# Patient Record
Sex: MALE | Race: WHITE
[De-identification: names, ages, dates, MRNs, and addresses within clinical notes are randomized per-mention and may not be internally consistent; named-entity substitution may affect disease eponyms.]

---

## 2017-11-07 ENCOUNTER — HOSPITAL ENCOUNTER (OUTPATIENT)
Dept: HOSPITAL 18 - NAV RAD | Age: 57
Discharge: HOME | End: 2017-11-07
Payer: COMMERCIAL

## 2017-11-07 DIAGNOSIS — M25.571: ICD-10-CM

## 2017-11-07 DIAGNOSIS — M17.11: ICD-10-CM

## 2017-11-07 DIAGNOSIS — M85.861: ICD-10-CM

## 2017-11-07 DIAGNOSIS — M25.561: Primary | ICD-10-CM

## 2017-11-07 DIAGNOSIS — M19.071: ICD-10-CM

## 2017-11-07 DIAGNOSIS — Z96.641: ICD-10-CM

## 2017-11-07 NOTE — RAD
TWO VIEWS RIGHT SHOULDER:

 

History: Disability evaluation. 

 

Comparison: None. 

 

FINDINGS: 

No obvious dislocation. Limited evaluation of the glenohumeral joint space. No definite fractures. 

 

IMPRESSION: 

Limited but unremarkable examination two views right shoulder. 

 

POS: [facility]

## 2017-11-07 NOTE — RAD
TWO VIEWS RIGHT ANKLE:

 

Indication: Disability evaluation. 

 

Comparison: None. 

 

FINDINGS: 

There is healed fracture deformity involving the calcaneus. There is off set of the posterior facet o
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 No acute fracture is evide
nt. Enthesopathic changes see of posterior calcaneus. 

 

IMPRESSION: 

1. 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
acture. 

2. No acute osseous abnormality. 

 

POS: [facility]

## 2017-11-07 NOTE — RAD
TWO VIEWS RIGHT KNEE:

 

Indication: Right knee pain. Disability evaluation. 

 

Comparison: None. 

 

FINDINGS: 

There is mild joint capsular distention. There is mild osteoarthrosis of the right knee. There is dif
fuse osteopenia. 

 

IMPRESSION: 

1. Mild osteoarthrosis of the right knee. 

2. Mild joint capsular distention. 

3. Diffuse osteopenia.

 

POS: [facility]

## 2023-01-04 ENCOUNTER — HOSPITAL ENCOUNTER (OUTPATIENT)
Dept: HOSPITAL 92 - LABBT | Age: 63
Discharge: HOME | End: 2023-01-04
Attending: NEUROLOGICAL SURGERY
Payer: COMMERCIAL

## 2023-01-04 ENCOUNTER — HOSPITAL ENCOUNTER (INPATIENT)
Dept: HOSPITAL 92 - SURG A | Age: 63
LOS: 6 days | Discharge: HOME | DRG: 460 | End: 2023-01-10
Attending: NEUROLOGICAL SURGERY | Admitting: NEUROLOGICAL SURGERY
Payer: COMMERCIAL

## 2023-01-04 VITALS — BODY MASS INDEX: 28.3 KG/M2

## 2023-01-04 DIAGNOSIS — Z20.822: ICD-10-CM

## 2023-01-04 DIAGNOSIS — Z79.899: ICD-10-CM

## 2023-01-04 DIAGNOSIS — Z91.09: ICD-10-CM

## 2023-01-04 DIAGNOSIS — I10: ICD-10-CM

## 2023-01-04 DIAGNOSIS — Z01.818: Primary | ICD-10-CM

## 2023-01-04 DIAGNOSIS — M48.062: Primary | ICD-10-CM

## 2023-01-04 DIAGNOSIS — M48.062: ICD-10-CM

## 2023-01-04 DIAGNOSIS — Z88.8: ICD-10-CM

## 2023-01-04 DIAGNOSIS — Z28.21: ICD-10-CM

## 2023-01-04 DIAGNOSIS — K21.9: ICD-10-CM

## 2023-01-04 LAB
ANION GAP SERPL CALC-SCNC: 15 MMOL/L (ref 10–20)
BUN SERPL-MCNC: 14 MG/DL (ref 8.4–25.7)
CALCIUM SERPL-MCNC: 9.4 MG/DL (ref 7.8–10.44)
CHLORIDE SERPL-SCNC: 101 MMOL/L (ref 98–107)
CO2 SERPL-SCNC: 26 MMOL/L (ref 23–31)
CREAT CL PREDICTED SERPL C-G-VRATE: 0 ML/MIN (ref 70–130)
GLUCOSE SERPL-MCNC: 106 MG/DL (ref 80–115)
HGB BLD-MCNC: 14 G/DL (ref 13.5–17.5)
MCH RBC QN AUTO: 29.3 PG (ref 27–33)
MCV RBC AUTO: 89.1 FL (ref 81.2–95.1)
PLATELET # BLD AUTO: 252 10X3/UL (ref 150–450)
POTASSIUM SERPL-SCNC: 4.4 MMOL/L (ref 3.5–5.1)
RBC # BLD AUTO: 4.78 10X6/UL (ref 4.32–5.72)
SODIUM SERPL-SCNC: 138 MMOL/L (ref 136–145)
WBC # BLD AUTO: 5.8 10X3/UL (ref 3.5–10.5)

## 2023-01-04 PROCEDURE — U0002 COVID-19 LAB TEST NON-CDC: HCPCS

## 2023-01-04 PROCEDURE — C1713 ANCHOR/SCREW BN/BN,TIS/BN: HCPCS

## 2023-01-04 PROCEDURE — 93005 ELECTROCARDIOGRAM TRACING: CPT

## 2023-01-04 PROCEDURE — 85027 COMPLETE CBC AUTOMATED: CPT

## 2023-01-04 PROCEDURE — 80048 BASIC METABOLIC PNL TOTAL CA: CPT

## 2023-01-04 PROCEDURE — 93010 ELECTROCARDIOGRAM REPORT: CPT

## 2023-01-04 PROCEDURE — C1768 GRAFT, VASCULAR: HCPCS

## 2023-01-09 PROCEDURE — 0SG0071 FUSION OF LUMBAR VERTEBRAL JOINT WITH AUTOLOGOUS TISSUE SUBSTITUTE, POSTERIOR APPROACH, POSTERIOR COLUMN, OPEN APPROACH: ICD-10-PCS | Performed by: NEUROLOGICAL SURGERY

## 2023-01-09 PROCEDURE — 01NB0ZZ RELEASE LUMBAR NERVE, OPEN APPROACH: ICD-10-PCS | Performed by: NEUROLOGICAL SURGERY

## 2023-01-09 PROCEDURE — 3E0U0GB INTRODUCTION OF RECOMBINANT BONE MORPHOGENETIC PROTEIN INTO JOINTS, OPEN APPROACH: ICD-10-PCS | Performed by: NEUROLOGICAL SURGERY

## 2023-01-09 RX ADMIN — ACETAMINOPHEN AND CODEINE PHOSPHATE PRN TAB: 300; 30 TABLET ORAL at 23:20

## 2023-01-10 VITALS — DIASTOLIC BLOOD PRESSURE: 66 MMHG | SYSTOLIC BLOOD PRESSURE: 126 MMHG | TEMPERATURE: 98.7 F

## 2023-01-10 RX ADMIN — ACETAMINOPHEN AND CODEINE PHOSPHATE PRN TAB: 300; 30 TABLET ORAL at 11:04

## 2023-01-24 ENCOUNTER — HOSPITAL ENCOUNTER (OUTPATIENT)
Dept: HOSPITAL 92 - TBSIIMAG | Age: 63
Discharge: HOME | End: 2023-01-24
Attending: NEUROLOGICAL SURGERY
Payer: COMMERCIAL

## 2023-01-24 DIAGNOSIS — Z98.890: ICD-10-CM

## 2023-01-24 DIAGNOSIS — I70.0: ICD-10-CM

## 2023-01-24 DIAGNOSIS — M48.062: Primary | ICD-10-CM

## 2023-01-24 DIAGNOSIS — M47.816: ICD-10-CM

## 2023-01-24 DIAGNOSIS — Q76.49: ICD-10-CM

## 2023-01-24 PROCEDURE — 72100 X-RAY EXAM L-S SPINE 2/3 VWS: CPT

## 2023-02-17 ENCOUNTER — HOSPITAL ENCOUNTER (OUTPATIENT)
Dept: HOSPITAL 92 - ULT | Age: 63
Discharge: HOME | End: 2023-02-17
Attending: NEUROLOGICAL SURGERY
Payer: COMMERCIAL

## 2023-02-17 DIAGNOSIS — M79.605: Primary | ICD-10-CM

## 2023-03-29 ENCOUNTER — HOSPITAL ENCOUNTER (OUTPATIENT)
Dept: HOSPITAL 92 - LABBT | Age: 63
Discharge: HOME | End: 2023-03-29
Attending: THORACIC SURGERY (CARDIOTHORACIC VASCULAR SURGERY)
Payer: COMMERCIAL

## 2023-03-29 DIAGNOSIS — I73.9: ICD-10-CM

## 2023-03-29 DIAGNOSIS — Z01.818: Primary | ICD-10-CM

## 2023-03-29 LAB
ANION GAP SERPL CALC-SCNC: 16 MMOL/L (ref 10–20)
BUN SERPL-MCNC: 18 MG/DL (ref 8.4–25.7)
CALCIUM SERPL-MCNC: 9.6 MG/DL (ref 7.8–10.44)
CHLORIDE SERPL-SCNC: 102 MMOL/L (ref 98–107)
CO2 SERPL-SCNC: 23 MMOL/L (ref 23–31)
CREAT CL PREDICTED SERPL C-G-VRATE: 0 ML/MIN (ref 70–130)
GLUCOSE SERPL-MCNC: 128 MG/DL (ref 80–115)
HGB BLD-MCNC: 13.2 G/DL (ref 13.5–17.5)
MCH RBC QN AUTO: 27.3 PG (ref 27–33)
MCV RBC AUTO: 86.6 FL (ref 81.2–95.1)
PLATELET # BLD AUTO: 261 10X3/UL (ref 150–450)
POTASSIUM SERPL-SCNC: 4.2 MMOL/L (ref 3.5–5.1)
RBC # BLD AUTO: 4.84 10X6/UL (ref 4.32–5.72)
SODIUM SERPL-SCNC: 137 MMOL/L (ref 136–145)
WBC # BLD AUTO: 6.9 10X3/UL (ref 3.5–10.5)

## 2023-03-29 PROCEDURE — 93005 ELECTROCARDIOGRAM TRACING: CPT

## 2023-03-29 PROCEDURE — 80048 BASIC METABOLIC PNL TOTAL CA: CPT

## 2023-03-29 PROCEDURE — 85027 COMPLETE CBC AUTOMATED: CPT

## 2023-03-29 PROCEDURE — 93010 ELECTROCARDIOGRAM REPORT: CPT

## 2023-03-31 ENCOUNTER — HOSPITAL ENCOUNTER (OUTPATIENT)
Dept: HOSPITAL 92 - SDC | Age: 63
Discharge: HOME | End: 2023-03-31
Attending: THORACIC SURGERY (CARDIOTHORACIC VASCULAR SURGERY)
Payer: COMMERCIAL

## 2023-03-31 VITALS — BODY MASS INDEX: 30.1 KG/M2

## 2023-03-31 DIAGNOSIS — Z88.5: ICD-10-CM

## 2023-03-31 DIAGNOSIS — I70.8: ICD-10-CM

## 2023-03-31 DIAGNOSIS — Z91.048: ICD-10-CM

## 2023-03-31 DIAGNOSIS — F17.210: ICD-10-CM

## 2023-03-31 DIAGNOSIS — Z79.899: ICD-10-CM

## 2023-03-31 DIAGNOSIS — K21.9: ICD-10-CM

## 2023-03-31 DIAGNOSIS — I70.222: Primary | ICD-10-CM

## 2023-03-31 DIAGNOSIS — I10: ICD-10-CM

## 2023-03-31 DIAGNOSIS — Z79.82: ICD-10-CM

## 2023-03-31 PROCEDURE — 04HH3DZ INSERTION OF INTRALUMINAL DEVICE INTO RIGHT EXTERNAL ILIAC ARTERY, PERCUTANEOUS APPROACH: ICD-10-PCS | Performed by: THORACIC SURGERY (CARDIOTHORACIC VASCULAR SURGERY)

## 2023-03-31 PROCEDURE — 37221: CPT

## 2023-03-31 PROCEDURE — 04HJ3DZ INSERTION OF INTRALUMINAL DEVICE INTO LEFT EXTERNAL ILIAC ARTERY, PERCUTANEOUS APPROACH: ICD-10-PCS | Performed by: THORACIC SURGERY (CARDIOTHORACIC VASCULAR SURGERY)

## 2023-03-31 PROCEDURE — C1725 CATH, TRANSLUMIN NON-LASER: HCPCS

## 2023-03-31 PROCEDURE — C1894 INTRO/SHEATH, NON-LASER: HCPCS

## 2023-03-31 PROCEDURE — 37223: CPT

## 2023-03-31 PROCEDURE — 99153 MOD SED SAME PHYS/QHP EA: CPT

## 2023-03-31 PROCEDURE — 75716 ARTERY X-RAYS ARMS/LEGS: CPT

## 2023-03-31 PROCEDURE — 99152 MOD SED SAME PHYS/QHP 5/>YRS: CPT

## 2023-03-31 PROCEDURE — 85347 COAGULATION TIME ACTIVATED: CPT

## 2023-03-31 PROCEDURE — 04HD3DZ INSERTION OF INTRALUMINAL DEVICE INTO LEFT COMMON ILIAC ARTERY, PERCUTANEOUS APPROACH: ICD-10-PCS | Performed by: THORACIC SURGERY (CARDIOTHORACIC VASCULAR SURGERY)

## 2023-03-31 PROCEDURE — 04HC3DZ INSERTION OF INTRALUMINAL DEVICE INTO RIGHT COMMON ILIAC ARTERY, PERCUTANEOUS APPROACH: ICD-10-PCS | Performed by: THORACIC SURGERY (CARDIOTHORACIC VASCULAR SURGERY)

## 2023-03-31 PROCEDURE — C1769 GUIDE WIRE: HCPCS

## 2023-03-31 PROCEDURE — C1887 CATHETER, GUIDING: HCPCS
